# Patient Record
Sex: FEMALE | Race: OTHER | HISPANIC OR LATINO | URBAN - METROPOLITAN AREA
[De-identification: names, ages, dates, MRNs, and addresses within clinical notes are randomized per-mention and may not be internally consistent; named-entity substitution may affect disease eponyms.]

---

## 2017-05-07 ENCOUNTER — EMERGENCY (EMERGENCY)
Facility: HOSPITAL | Age: 27
LOS: 1 days | Discharge: HOME | End: 2017-05-07
Attending: EMERGENCY MEDICINE
Payer: MEDICAID

## 2017-05-07 VITALS
HEIGHT: 63 IN | WEIGHT: 160.06 LBS | DIASTOLIC BLOOD PRESSURE: 76 MMHG | OXYGEN SATURATION: 100 % | RESPIRATION RATE: 18 BRPM | TEMPERATURE: 99 F | SYSTOLIC BLOOD PRESSURE: 131 MMHG | HEART RATE: 92 BPM

## 2017-05-07 DIAGNOSIS — N75.1 ABSCESS OF BARTHOLIN'S GLAND: ICD-10-CM

## 2017-05-07 LAB
APPEARANCE UR: CLEAR — SIGNIFICANT CHANGE UP
BILIRUB UR-MCNC: NEGATIVE — SIGNIFICANT CHANGE UP
COLOR SPEC: YELLOW — SIGNIFICANT CHANGE UP
DIFF PNL FLD: NEGATIVE — SIGNIFICANT CHANGE UP
GLUCOSE UR QL: NEGATIVE — SIGNIFICANT CHANGE UP
HCG UR QL: NEGATIVE — SIGNIFICANT CHANGE UP
KETONES UR-MCNC: NEGATIVE — SIGNIFICANT CHANGE UP
LEUKOCYTE ESTERASE UR-ACNC: ABNORMAL
NITRITE UR-MCNC: NEGATIVE — SIGNIFICANT CHANGE UP
PH UR: 8 — SIGNIFICANT CHANGE UP (ref 5–8)
PROT UR-MCNC: NEGATIVE — SIGNIFICANT CHANGE UP
SP GR SPEC: 1.01 — SIGNIFICANT CHANGE UP (ref 1.01–1.02)
UROBILINOGEN FLD QL: NEGATIVE — SIGNIFICANT CHANGE UP

## 2017-05-07 PROCEDURE — 81025 URINE PREGNANCY TEST: CPT

## 2017-05-07 PROCEDURE — 99283 EMERGENCY DEPT VISIT LOW MDM: CPT

## 2017-05-07 PROCEDURE — 81001 URINALYSIS AUTO W/SCOPE: CPT

## 2017-05-07 RX ORDER — IBUPROFEN 200 MG
800 TABLET ORAL ONCE
Qty: 0 | Refills: 0 | Status: COMPLETED | OUTPATIENT
Start: 2017-05-07 | End: 2017-05-07

## 2017-05-07 RX ORDER — IBUPROFEN 200 MG
1 TABLET ORAL
Qty: 30 | Refills: 0 | OUTPATIENT
Start: 2017-05-07

## 2017-05-07 RX ADMIN — Medication 800 MILLIGRAM(S): at 21:27

## 2017-05-07 NOTE — ED PROVIDER NOTE - MEDICAL DECISION MAKING DETAILS
28 y/o F pt w/ Bartholin abscess. OB/GYN consult, Tylenol for pain & reassess. 28 y/o F pt w/ Bartholin abscess. OB/GYN consult, motrin for pain & reassess.  seen by GYN, not ready to drain, home with augmentin, clinic followup in 1 week for possible drainag

## 2017-05-07 NOTE — ED PROVIDER NOTE - OBJECTIVE STATEMENT
28 y/o F w/ no significant PMHx presents to the ED c/o pain & swelling to vaginal area x3 days. Pt also notes different colored urine, such as red, yellow & orange colored urine. Pt states Sx feel like ball to abd & states she feels that ball became smaller & went lower down. Pt denies fever, chills, nausea, vomiting, diarrhea, or any other complaints. NKDA.

## 2017-05-07 NOTE — ED PROVIDER NOTE - NS ED MD SCRIBE ATTENDING SCRIBE SECTIONS
PHYSICAL EXAM/HIV/DISPOSITION/PAST MEDICAL/SURGICAL/SOCIAL HISTORY/HISTORY OF PRESENT ILLNESS/REVIEW OF SYSTEMS/VITAL SIGNS( Pullset)

## 2017-05-07 NOTE — ED ADULT NURSE NOTE - OBJECTIVE STATEMENT
Patient complains of vaginal pain, yellow discharge x 3 days. Patient states subjective fever at home, 42 celcius. Abdomen is soft, non tender, non distended. No guarding or facial grimacing noted. Denies vaginal bleeding, dizziness.

## 2021-04-13 NOTE — ED ADULT TRIAGE NOTE - AS HEIGHT TYPE
Pt w/ cellulitis to LL extremity, s/p failed trial of clindamycin, admitted for IV abx.  - C/w vancomycin pending clinical improvement of LLE cellulitis  - BCx: NGTD  - MRSA swab negative  - C/w Tylenol PRN for mild/moderate pain; oxycodone 10mg PO for severe pain  - CT w/wout contrast of LLE (4/12): +cellulitis; negative for abscess  - ID consulted given pt w/out improvement despite vanc at therapeutic level; pending recs Pt w/ cellulitis to LL extremity, s/p failed trial of clindamycin, admitted for IV abx.  - C/w vancomycin pending clinical improvement of LLE cellulitis  - BCx: NGTD  - MRSA swab negative  - C/w Tylenol PRN for mild/moderate pain; oxycodone 10mg PO for severe pain  - CT w/wout contrast of LLE (4/12): +cellulitis; negative for abscess  - ID consulted given pt w/out improvement despite vanc at therapeutic level; Per ID:  -- Start cefazolin 2g IV q8h  - Wound care following; appreciate recs stated

## 2023-10-16 NOTE — ED ADULT NURSE NOTE - PRIMARY CARE PROVIDER
Form completed and signed by provider. Sent in mail for patient. Faxed to HIM and copy placed in retained folder. Patient notified.   
PAPERWORK REQUEST    Form received on: 10/13/2023  How was form received: In Person  Preferred Provider: Layne Sanders CNM  Type of form: [facility]  Date needed by: ASAP  What to do with form once completed: Please mail to pt's address only.   Where was form placed?: Provider basket  Has an СВЕТЛАНА been signed? Not Applicable    Additional Notes:  
n/a

## 2024-12-09 NOTE — ED ADULT NURSE NOTE - PATIENT DISCHARGE SIGNATURE
Please apologize to her and let her know we are limited in being able to do this, but if she is having to use it that much more frequently we may need her to see headache specialist  07-May-2017

## 2025-04-16 NOTE — ED PROVIDER NOTE - CROS ED ROS STATEMENT
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

## 2025-05-13 NOTE — ED PROVIDER NOTE - CPE EDP HEME LYMPH NORM
Transitional Care Management WEEKLY Telephone Call Attempt    Call Attempt Date: 5/13/2025  Call Attempt: Second -- weekly attempt    Unable to leave [facility].  Writer will try again at later date/time.     normal...

## 2025-08-06 NOTE — ED PROVIDER NOTE - DISCUSSED CLINICAL AND RADIOLOGICAL FINDINGS WITH, MDM
Pt comment -   I'm out of shots for this month. Will you please prescribe them again? Does the dose increase again as well?    patient